# Patient Record
Sex: FEMALE | Race: WHITE
[De-identification: names, ages, dates, MRNs, and addresses within clinical notes are randomized per-mention and may not be internally consistent; named-entity substitution may affect disease eponyms.]

---

## 2017-01-02 ENCOUNTER — HOSPITAL ENCOUNTER (OUTPATIENT)
Dept: HOSPITAL 45 - C.LAB1850 | Age: 72
Discharge: HOME | End: 2017-01-02
Attending: FAMILY MEDICINE
Payer: COMMERCIAL

## 2017-01-02 DIAGNOSIS — D64.9: Primary | ICD-10-CM

## 2017-01-31 ENCOUNTER — HOSPITAL ENCOUNTER (OUTPATIENT)
Dept: HOSPITAL 45 - C.GI | Age: 72
Discharge: HOME | End: 2017-01-31
Attending: INTERNAL MEDICINE
Payer: COMMERCIAL

## 2017-01-31 VITALS — HEART RATE: 86 BPM | OXYGEN SATURATION: 99 % | SYSTOLIC BLOOD PRESSURE: 99 MMHG | DIASTOLIC BLOOD PRESSURE: 59 MMHG

## 2017-01-31 VITALS
BODY MASS INDEX: 23.67 KG/M2 | WEIGHT: 130.27 LBS | HEIGHT: 62.01 IN | HEIGHT: 62.01 IN | WEIGHT: 130.27 LBS | BODY MASS INDEX: 23.67 KG/M2

## 2017-01-31 DIAGNOSIS — Z87.891: ICD-10-CM

## 2017-01-31 DIAGNOSIS — K57.30: ICD-10-CM

## 2017-01-31 DIAGNOSIS — Z12.11: Primary | ICD-10-CM

## 2017-01-31 DIAGNOSIS — Z88.8: ICD-10-CM

## 2017-01-31 DIAGNOSIS — Z88.5: ICD-10-CM

## 2017-01-31 DIAGNOSIS — Z98.890: ICD-10-CM

## 2017-01-31 DIAGNOSIS — K64.8: ICD-10-CM

## 2017-01-31 NOTE — DISCHARGE INSTRUCTIONS
Endoscopy Patient Instructions


Date / Procedure(s) Performed


Jan 31, 2017.


Colonoscopy





Allergy Information


Coded Allergies:  


     Ezetimibe (Unverified  Allergy, Unknown, TIRED, 1/31/17)


     Simvastatin (Unverified  Allergy, Unknown, TIRED, 1/31/17)


     Tramadol (Verified  Adverse Reaction, Mild, GI UPSET, 1/31/17)


     Atorvastatin (Verified  Adverse Reaction, Unknown, gi upset, 1/31/17)





Discharge Date / Findings


Jan 31, 2017.


Diverticulosis


Internal hemorrhoids





Medication Instructions


Stopped Medication(s):  


TOLD ONLY TO TAKE COREG TODAY


OK to resume all medications today as prescribed.


 Reported Home Medications








 Medications  Dose


 Route/Sig


 Max Daily Dose Days Date Category Dose


Instructions


 


 Claritin


  (Loratadine) 10


 Mg Tab  10 Mg


 PO QAM


    1/23/17 Reported 


 


 Vitamin D


  (Cholecalciferol)


 1,000 Unit Tab  1 Tab


 PO QAM


    1/23/17 Reported 


 


 Vitamin B Complex


  (B-Complex


 Vitamins) 1 Tab


 Tab  1 Tab


 PO QAM


    1/23/17 Reported 


 


 Multivitamin


  (Multivitamins)


 Tab  1 Tab


 PO QAM


    1/23/17 Reported 


 


 Zestril


  (Lisinopril) 10


 Mg Tab  10 Mg


 PO HS


    1/23/17 Reported 


 


 Glucophage


  (Metformin Hcl)


 1,000 Mg Tab  1,000 Mg


 PO QAM


    1/23/17 Reported 


 


 Lasix


  (Furosemide) 20


 Mg Tab  20 Mg


 PO QAM


    1/23/17 Reported 


 


 Coreg


  (Carvedilol) 6.25


 Mg Tab  2 Tab


 PO BID


    1/23/17 Reported 


 


 Crestor


  (Rosuvastatin


 Calcium) 5 Mg Tab  5 Mg


 PO MOFR


    11/15/16 Reported  Mondays and Fridays


 


 Magnesium


  (Magnesium Oxide


  (Mg Supplement)


 400 Mg Cap  400 Mg


 PO QAM


    1/20/15 Reported 


 


 Nasonex


  (Mometasone


 Furoate)  Spray  1 Spray


 NA BID


    1/20/15 Reported 


 


 Probiotic


  (Lactobacillus) 1


 Cap Cap  1 Cap


 PO QAM


    1/20/15 Reported 


 


 Ubiquinol 100 Mg


 Cap  100 Mg


 PO QAM


    1/20/15 Reported 


 


 Plaquenil


  (Hydroxychloroquine


 Sulfate) 200 Mg


 Tab  200 Mg


 PO HS


    2/18/11 Reported 


 


 Omega-3 (Fish


 Oil) 1 Ea Cap  1 Gm


 PO TID


    6/15/09 Reported 











Provider Instructions





Activity Restrictions





-  No exercising or heavy lifting for 24 hours. 


-  Do not drink alcohol the day of the procedure.


-  Do not drive a car or operate machinery until the day after the procedure.


-  Do not make any important decisions or sign important papers in 24 hours 

after the procedure.





Following Day:





-  Return to full activity which may include returning to work/school.





Diet





Start your diet with liquids and light foods (jello, soup, juice, toast).  Then 

eat your usual diet if not nauseated.





Treatment For Common After Affects





For mild abdominal pain, bloating, or excessive gas:





-  Rest


-  Eat lightly


-  Lie on right side





Follow-Up Information


Follow-up with DR BUNCH as scheduled





Anesthesia Information





What You Should Know





You have had a procedure that required some medicine to reduce anxiety and 

discomfort. This treatment is called moderate sedation.  


After receiving the treatment, you may be sleepy, but you will be able to 

breathe on your own.  The effects of the treatment may last for several hours.








Follow these instructions along with Activity/Diet recommendations noted above:





*  Do NOT do anything where dizziness or clumsiness would be dangerous.





*  Rest quietly at home today, then you can be up and about tomorrow.





*  Have a responsible person stay with you the rest of today.





*  You may have had an I.V. today.  If so, you may take the dressing off later 

today.





Recommendations


 


Call your doctor if:





*  Trouble breathing 





*  Continuous vomiting for more than 24 hours





*  Temperature above 101 degrees





*  Severe abdominal pain or bloating





*  Pain not relieved by pain medicine ordered





*  There is increased drainage or redness from any incision





*  A large amount of rectal bleeding greater than 2-3 tablespoons. 


   (If you had a polyp/s removed or have hemorrhoids, a small amount of blood -


    from the rectum is to be expected.)





*  You have any unanswered questions or concerns.





IN THE EVENT OF A SERIOUS EMERGENCY, GO TO THE NEAREST EMERGENCY ROOM





       Your discharge instructions were prepared by provider Dave Rogers.


 Patient Instructions Signature Page








Shaunna Gleason 











Patient (or Guardian) Signature/Date:____________________________________ I 

have read and understand the instructions given to me by my caregivers.








Caregiver/RN/Doctor Signature/Date:____________________________________








The above-named patient and/or guardian has received patient instructions on 

this date.


























+  Original Patient Signature Page (only) stays with chart.  Please make copy 

for patient.

## 2017-01-31 NOTE — ENDO HISTORY AND PHYSICAL
History & Physical


Date of Service:


Jan 31, 2017.


Chief Complaint:


FOLLOW UP FOR GI BLEED IN NOVEMBER 2016


Referring Physician:


DR BUNCH


History of Present Illness


72 yo CF who presents for Colonoscopy secondary to GI bleeding.





Past Surgical History


Hx Cardiac Surgery:  No


Hx Internal Defibrillator:  No


Hx Pacemaker:  No


Hx Abdominal Surgery:  Yes ([facility] BSO)


Hx of Implantable Prosthesis:  No (SINUS SURGERY, DENTAL EXTR AND IMPLANTS)


Hx Post-Op Nausea and Vomiting:  Yes (PONV)


Hx Cancer Surgery:  No


Hx Thoracic Surgery:  No


Hx Orthopedic:  Yes (ORIF RIGHT ANKLE, RIGHT ROTATOR CUFF REPAIR)


Hx Urinary Tract Surgery:  No





Family History


None





Social History


Smoking Status:  Former Smoker


Hx Substance Use:  Yes


Hx Alcohol Use:  Yes (2 A WEEK )





Allergies


Coded Allergies:  


     Ezetimibe (Unverified  Allergy, Unknown, TIRED, 1/31/17)


     Simvastatin (Unverified  Allergy, Unknown, TIRED, 1/31/17)


     Tramadol (Verified  Adverse Reaction, Mild, GI UPSET, 1/31/17)


     Atorvastatin (Verified  Adverse Reaction, Unknown, gi upset, 1/31/17)





Current Medications





 Reported Home Medications








 Medications  Dose


 Route/Sig


 Max Daily Dose Days Date Category Dose


Instructions


 


 Claritin


  (Loratadine) 10


 Mg Tab  10 Mg


 PO QAM


    1/23/17 Reported 


 


 Vitamin D


  (Cholecalciferol)


 1,000 Unit Tab  1 Tab


 PO QAM


    1/23/17 Reported 


 


 Vitamin B Complex


  (B-Complex


 Vitamins) 1 Tab


 Tab  1 Tab


 PO QAM


    1/23/17 Reported 


 


 Multivitamin


  (Multivitamins)


 Tab  1 Tab


 PO QAM


    1/23/17 Reported 


 


 Zestril


  (Lisinopril) 10


 Mg Tab  10 Mg


 PO HS


    1/23/17 Reported 


 


 Glucophage


  (Metformin Hcl)


 1,000 Mg Tab  1,000 Mg


 PO QAM


    1/23/17 Reported 


 


 Lasix


  (Furosemide) 20


 Mg Tab  20 Mg


 PO QAM


    1/23/17 Reported 


 


 Coreg


  (Carvedilol) 6.25


 Mg Tab  2 Tab


 PO BID


    1/23/17 Reported 


 


 Crestor


  (Rosuvastatin


 Calcium) 5 Mg Tab  5 Mg


 PO MOFR


    11/15/16 Reported  Mondays and Fridays


 


 Magnesium


  (Magnesium Oxide


  (Mg Supplement)


 400 Mg Cap  400 Mg


 PO QAM


    1/20/15 Reported 


 


 Nasonex


  (Mometasone


 Furoate)  Spray  1 Spray


 NA BID


    1/20/15 Reported 


 


 Probiotic


  (Lactobacillus) 1


 Cap Cap  1 Cap


 PO QAM


    1/20/15 Reported 


 


 Ubiquinol 100 Mg


 Cap  100 Mg


 PO QAM


    1/20/15 Reported 


 


 Plaquenil


  (Hydroxychloroquine


 Sulfate) 200 Mg


 Tab  200 Mg


 PO HS


    2/18/11 Reported 


 


 Omega-3 (Fish


 Oil) 1 Ea Cap  1 Gm


 PO TID


    6/15/09 Reported 











Vital Signs


Weight (Kilograms):  59.09


Height (Feet):  5


Height (Inches):  2











  Date Time  Temp Pulse Resp B/P Pulse Ox O2 Delivery O2 Flow Rate FiO2


 


1/31/17 11:05 36.6 79 16 123/73 98 Room Air  











Physical Exam


General Appearance:  WD/WN, no apparent distress


Respiratory/Chest:  


   Auscultation:  breath sounds normal


Cardiovascular:  


   Heart Auscultation:  RRR


Abdomen:  


   Bowel Sounds:  normal


   Inspection & Palpation:  soft, non-distended, no tenderness, guarding & 

rebound





Assessment and Plan


Assessment:


72 yo CF who presents for Colonoscopy secondary to GI bleeding.








Plan:


Proceed with colonoscopy.

## 2017-01-31 NOTE — ANESTHESIOLOGY PROGRESS NOTE
Anesthesia Post Op Note


Date & Time


Jan 31, 2017 at 11:53





Vital Signs


Pain Intensity:  0





 Vital Signs Past 12 Hours








  Date Time  Temp Pulse Resp B/P Pulse Ox O2 Delivery O2 Flow Rate FiO2


 


1/31/17 11:05 36.6 79 16 123/73 98 Room Air  











Notes


Mental Status:  alert / awake / arousable, participated in evaluation


Pt Amnestic to Procedure:  Yes


Nausea / Vomiting:  adequately controlled


Pain:  adequately controlled


Airway Patency, RR, SpO2:  stable & adequate


BP & HR:  stable & adequate


Hydration State:  stable & adequate


Anesthetic Complications:  no major complications apparent

## 2017-03-01 ENCOUNTER — HOSPITAL ENCOUNTER (OUTPATIENT)
Dept: HOSPITAL 45 - C.RAD1850 | Age: 72
Discharge: HOME | End: 2017-03-01
Attending: FAMILY MEDICINE
Payer: COMMERCIAL

## 2017-03-01 DIAGNOSIS — R05: Primary | ICD-10-CM

## 2017-03-16 ENCOUNTER — HOSPITAL ENCOUNTER (OUTPATIENT)
Dept: HOSPITAL 45 - C.LAB1850 | Age: 72
Discharge: HOME | End: 2017-03-16
Attending: FAMILY MEDICINE
Payer: COMMERCIAL

## 2017-03-16 DIAGNOSIS — M85.80: Primary | ICD-10-CM

## 2017-03-16 DIAGNOSIS — E55.9: ICD-10-CM

## 2017-03-16 DIAGNOSIS — D64.9: ICD-10-CM

## 2017-03-16 DIAGNOSIS — I42.9: ICD-10-CM

## 2017-03-16 DIAGNOSIS — E78.5: ICD-10-CM

## 2017-03-16 DIAGNOSIS — R73.01: ICD-10-CM

## 2017-03-16 LAB
ALBUMIN/GLOB SERPL: 1 {RATIO} (ref 0.9–2)
ALP SERPL-CCNC: 65 U/L (ref 45–117)
ALT SERPL-CCNC: 31 U/L (ref 12–78)
ANION GAP SERPL CALC-SCNC: 7 MMOL/L (ref 3–11)
AST SERPL-CCNC: 15 U/L (ref 15–37)
BUN SERPL-MCNC: 14 MG/DL (ref 7–18)
BUN/CREAT SERPL: 16.6 (ref 10–20)
CALCIUM SERPL-MCNC: 9.2 MG/DL (ref 8.5–10.1)
CHLORIDE SERPL-SCNC: 108 MMOL/L (ref 98–107)
CHOLEST/HDLC SERPL: 3.5 {RATIO}
CO2 SERPL-SCNC: 28 MMOL/L (ref 21–32)
CREAT SERPL-MCNC: 0.86 MG/DL (ref 0.6–1.2)
GLOBULIN SER-MCNC: 3.6 GM/DL (ref 2.5–4)
GLUCOSE SERPL-MCNC: 101 MG/DL (ref 70–99)
GLUCOSE UR QL: 69 MG/DL
KETONES UR QL STRIP: 126 MG/DL
NITRITE UR QL STRIP: 221 MG/DL (ref 0–150)
PH UR: 239 MG/DL (ref 0–200)
POTASSIUM SERPL-SCNC: 4.2 MMOL/L (ref 3.5–5.1)
SODIUM SERPL-SCNC: 143 MMOL/L (ref 136–145)
VERY LOW DENSITY LIPOPROT CALC: 44 MG/DL

## 2017-07-18 ENCOUNTER — HOSPITAL ENCOUNTER (OUTPATIENT)
Dept: HOSPITAL 45 - C.CTS | Age: 72
Discharge: HOME | End: 2017-07-18
Attending: PHYSICIAN ASSISTANT
Payer: COMMERCIAL

## 2017-07-18 DIAGNOSIS — R19.7: ICD-10-CM

## 2017-07-18 DIAGNOSIS — K57.30: Primary | ICD-10-CM

## 2017-07-18 DIAGNOSIS — K57.92: ICD-10-CM

## 2017-07-18 NOTE — DIAGNOSTIC IMAGING REPORT
CT OF THE ABDOMEN AND PELVIS WITH CONTRAST



CLINICAL HISTORY: Diffuse abdominal pain. Evaluate for acute diverticulitis.    



COMPARISON STUDY:  CT of the abdomen and pelvis November 15, 2016.



TECHNIQUE: Following IV administration of 94 mL of Optiray-320, axial images of

the abdomen and pelvis were obtained from the lung bases to the proximal femurs.

Images were reviewed in the axial, sagittal, and coronal planes. IV contrast was

administered without complication. Oral contrast was administered.



CT DOSE: 296.16 mGy.cm



FINDINGS: There is fatty infiltration of the liver. Extensive atherosclerotic

calcification of the splenic artery is noted. The spleen, adrenal glands,

kidneys and pancreas are normal. There is no evidence for a bowel obstruction.

There is sigmoid diverticulosis without evidence for acute diverticulitis.

Caliber and wall thickness of small and large bowel are normal. The appendix is

normal. No suspicious skeletal lesions are present. There is no hydronephrosis.







IMPRESSION:  



1. No acute process within the abdomen or pelvis.



2. Sigmoid diverticulosis without evidence for acute diverticulitis.



3. Fatty liver.







Electronically signed by:  Sekou Valles M.D.

7/18/2017 3:16 PM



Dictated Date/Time:  7/18/2017 3:07 PM

## 2017-09-14 ENCOUNTER — HOSPITAL ENCOUNTER (OUTPATIENT)
Dept: HOSPITAL 45 - C.RAD1850 | Age: 72
Discharge: HOME | End: 2017-09-14
Attending: FAMILY MEDICINE
Payer: COMMERCIAL

## 2017-09-14 DIAGNOSIS — M19.072: Primary | ICD-10-CM

## 2017-09-14 NOTE — DIAGNOSTIC IMAGING REPORT
LEFT FOOT MIN 3 VIEWS ROUTINE



CLINICAL HISTORY: M79.672 Acute foot pain, localized to the second metatarsal

phalangeal joint.



COMPARISON: None.



DISCUSSION: The bones are mildly osteopenic. No fractures or dislocations are

visualized. There is a plantar calcaneal spur. There are degenerative changes

the level the first metatarsal phalangeal joint. No destructive lesions are

visualized.    



IMPRESSION: 

1. No fractures identified

2. No destructive lesions identified

3. Degenerative changes the level the first metatarsal phalangeal joint







Electronically signed by:  Maximilian Logan M.D.

9/14/2017 4:36 PM



Dictated Date/Time:  9/14/2017 4:35 PM

## 2017-10-02 ENCOUNTER — HOSPITAL ENCOUNTER (OUTPATIENT)
Dept: HOSPITAL 45 - C.MAMM | Age: 72
Discharge: HOME | End: 2017-10-02
Attending: OBSTETRICS & GYNECOLOGY
Payer: COMMERCIAL

## 2017-10-02 DIAGNOSIS — Z12.31: Primary | ICD-10-CM

## 2017-10-02 NOTE — MAMMOGRAPHY REPORT
BILATERAL DIGITAL SCREENING MAMMOGRAM WITH CAD: 10/2/2017

CLINICAL HISTORY: Routine screening.  Patient has no complaints.  





TECHNIQUE:  Current study was also evaluated with a Computer Aided Detection (CAD) system.  



COMPARISON: Comparison is made to exams dated:  11/9/2015 mammogram, 11/7/2014 mammogram, 9/23/2013 m
ammogram, 9/17/2012 mammogram, 9/14/2011 mammogram, and 9/10/2010 mammogram - [facility]
enter.   



BREAST COMPOSITION:  There are scattered areas of fibroglandular density in both breasts.  



FINDINGS:  The parenchymal pattern is similar to prior mammograms.  No developing mass, architectural
 distortion or cluster of suspicious microcalcifications is seen in either breast.  





IMPRESSION:  ACR BI-RADS CATEGORY 2: BENIGN

There is no mammographic evidence of malignancy. A 1 year screening mammogram is recommended.  The pa
tient will receive written notification of the results.  





Approximately 10% of breast cancers are not detected with mammography. A negative mammographic report
 should not delay biopsy if a clinically suggestive mass is present.



Sheryl Cheatham M.D.          

ay/:10/2/2017 13:27:00  



Imaging Technologist: Magdalene Davis RT(R)(M)(BD), [facility]

letter sent: Normal 1/2  

BI-RADS Code: ACR BI-RADS Category 2: Benign

## 2018-02-21 ENCOUNTER — HOSPITAL ENCOUNTER (OUTPATIENT)
Dept: HOSPITAL 45 - C.LAB1850 | Age: 73
Discharge: HOME | End: 2018-02-21
Attending: FAMILY MEDICINE
Payer: COMMERCIAL

## 2018-02-21 DIAGNOSIS — R10.32: Primary | ICD-10-CM

## 2018-02-21 DIAGNOSIS — K57.90: ICD-10-CM

## 2018-02-21 LAB
BASOPHILS # BLD: 0.05 K/UL (ref 0–0.2)
BASOPHILS NFR BLD: 0.7 %
BUN SERPL-MCNC: 15 MG/DL (ref 7–18)
CALCIUM SERPL-MCNC: 9.7 MG/DL (ref 8.5–10.1)
CO2 SERPL-SCNC: 31 MMOL/L (ref 21–32)
CREAT SERPL-MCNC: 0.96 MG/DL (ref 0.6–1.2)
EOS ABS #: 0.18 K/UL (ref 0–0.5)
EOSINOPHIL NFR BLD AUTO: 252 K/UL (ref 130–400)
GLUCOSE SERPL-MCNC: 88 MG/DL (ref 70–99)
HCT VFR BLD CALC: 43.7 % (ref 37–47)
HGB BLD-MCNC: 15.3 G/DL (ref 12–16)
IG#: 0.02 K/UL (ref 0–0.02)
IMM GRANULOCYTES NFR BLD AUTO: 27.4 %
LYMPHOCYTES # BLD: 1.91 K/UL (ref 1.2–3.4)
MCH RBC QN AUTO: 31.7 PG (ref 25–34)
MCHC RBC AUTO-ENTMCNC: 35 G/DL (ref 32–36)
MCV RBC AUTO: 90.5 FL (ref 80–100)
MONO ABS #: 0.68 K/UL (ref 0.11–0.59)
MONOCYTES NFR BLD: 9.7 %
NEUT ABS #: 4.14 K/UL (ref 1.4–6.5)
NEUTROPHILS # BLD AUTO: 2.6 %
NEUTROPHILS NFR BLD AUTO: 59.3 %
PMV BLD AUTO: 9.2 FL (ref 7.4–10.4)
POTASSIUM SERPL-SCNC: 4.1 MMOL/L (ref 3.5–5.1)
RED CELL DISTRIBUTION WIDTH CV: 12.9 % (ref 11.5–14.5)
RED CELL DISTRIBUTION WIDTH SD: 42.8 FL (ref 36.4–46.3)
SODIUM SERPL-SCNC: 137 MMOL/L (ref 136–145)
WBC # BLD AUTO: 6.98 K/UL (ref 4.8–10.8)

## 2018-03-05 ENCOUNTER — HOSPITAL ENCOUNTER (EMERGENCY)
Dept: HOSPITAL 45 - C.EDB | Age: 73
Discharge: HOME | End: 2018-03-05
Payer: COMMERCIAL

## 2018-03-05 VITALS
BODY MASS INDEX: 24.6 KG/M2 | WEIGHT: 135.36 LBS | WEIGHT: 135.36 LBS | BODY MASS INDEX: 24.6 KG/M2 | HEIGHT: 62.01 IN | HEIGHT: 62.01 IN

## 2018-03-05 VITALS — SYSTOLIC BLOOD PRESSURE: 130 MMHG | DIASTOLIC BLOOD PRESSURE: 72 MMHG | OXYGEN SATURATION: 98 % | HEART RATE: 88 BPM

## 2018-03-05 VITALS — TEMPERATURE: 98.24 F

## 2018-03-05 DIAGNOSIS — E11.9: ICD-10-CM

## 2018-03-05 DIAGNOSIS — E78.5: ICD-10-CM

## 2018-03-05 DIAGNOSIS — I10: ICD-10-CM

## 2018-03-05 DIAGNOSIS — K57.32: Primary | ICD-10-CM

## 2018-03-05 DIAGNOSIS — R10.9: ICD-10-CM

## 2018-03-05 DIAGNOSIS — Z88.8: ICD-10-CM

## 2018-03-05 DIAGNOSIS — Z79.899: ICD-10-CM

## 2018-03-05 LAB
ALBUMIN SERPL-MCNC: 3.7 GM/DL (ref 3.4–5)
ALP SERPL-CCNC: 70 U/L (ref 45–117)
ALT SERPL-CCNC: 36 U/L (ref 12–78)
AST SERPL-CCNC: 22 U/L (ref 15–37)
BASOPHILS # BLD: 0.04 K/UL (ref 0–0.2)
BASOPHILS NFR BLD: 0.7 %
BUN SERPL-MCNC: 12 MG/DL (ref 7–18)
CALCIUM SERPL-MCNC: 9.3 MG/DL (ref 8.5–10.1)
CO2 SERPL-SCNC: 24 MMOL/L (ref 21–32)
CREAT SERPL-MCNC: 0.82 MG/DL (ref 0.6–1.2)
EOS ABS #: 0.17 K/UL (ref 0–0.5)
EOSINOPHIL NFR BLD AUTO: 302 K/UL (ref 130–400)
GLUCOSE SERPL-MCNC: 104 MG/DL (ref 70–99)
HCT VFR BLD CALC: 43.7 % (ref 37–47)
HGB BLD-MCNC: 15.7 G/DL (ref 12–16)
IG#: 0.01 K/UL (ref 0–0.02)
IMM GRANULOCYTES NFR BLD AUTO: 35.5 %
LIPASE: 241 U/L (ref 73–393)
LYMPHOCYTES # BLD: 1.96 K/UL (ref 1.2–3.4)
MCH RBC QN AUTO: 32.3 PG (ref 25–34)
MCHC RBC AUTO-ENTMCNC: 35.9 G/DL (ref 32–36)
MCV RBC AUTO: 89.9 FL (ref 80–100)
MONO ABS #: 0.59 K/UL (ref 0.11–0.59)
MONOCYTES NFR BLD: 10.7 %
NEUT ABS #: 2.75 K/UL (ref 1.4–6.5)
NEUTROPHILS # BLD AUTO: 3.1 %
NEUTROPHILS NFR BLD AUTO: 49.8 %
PMV BLD AUTO: 9.1 FL (ref 7.4–10.4)
POTASSIUM SERPL-SCNC: 4 MMOL/L (ref 3.5–5.1)
PROT SERPL-MCNC: 7.7 GM/DL (ref 6.4–8.2)
RED CELL DISTRIBUTION WIDTH CV: 12.7 % (ref 11.5–14.5)
RED CELL DISTRIBUTION WIDTH SD: 41.6 FL (ref 36.4–46.3)
SODIUM SERPL-SCNC: 140 MMOL/L (ref 136–145)
WBC # BLD AUTO: 5.52 K/UL (ref 4.8–10.8)

## 2018-03-05 NOTE — DIAGNOSTIC IMAGING REPORT
ABD/PELVIS IV CONTRAST ONLY



CT DOSE: 524.97 mGycm



HISTORY: Pain  worsening abdominal pain, chills, eval abscess, perf



TECHNIQUE: Multiaxial CT images of the abdomen and pelvis were performed

following the use of intravenous contrast.  A dose lowering technique was

utilized adhering to the principles of ALARA.





COMPARISON STUDY: 2/23/2018



FINDINGS: Lung bases remain clear. Stable fatty infiltration of liver. Kidneys

enhance uniformly. No evidence for hydronephrosis.



Findings of diverticulitis involving the mid descending colon are considered

stable. No evidence for abscess or collection.



Findings of acute diverticulitis involving the sigmoid colon appear at least

mildly improved. The wall thickening appears somewhat diminished as does the

pericolonic infiltrative change. No evidence for drainable abscess or

collection. Mild reactive small bowel ileus.



IMPRESSION: 



1. Acute diverticulitis of the mid descending and sigmoid colonic regions.

2. Mild improvement of the findings of the sigmoid colon with stable findings of

the mid descending colon.

3. No evidence for abscess collection or obstruction.

4. Mild reactive small bowel ileus. 







The above report was generated using voice recognition software.  It may contain

grammatical, syntax or spelling errors.







Electronically signed by:  Lei Lewis M.D.

3/5/2018 4:59 PM



Dictated Date/Time:  3/5/2018 4:52 PM

## 2018-03-05 NOTE — EMERGENCY ROOM VISIT NOTE
ED Visit Note


First contact with patient:  15:24


CHIEF  COMPLAINT: Abdominal pain and chills





HISTORY OF PRESENTING ILLNESS: This is a 72-year-old female who presents to the 

emergency department with abdominal pain that started 3 days ago.  Patient 

states that she was recently diagnosed with acute diverticulitis on 2/23, she 

was treated with ciprofloxacin and Flagyl which she completed today.  She 

states that initially she was feeling much better and her abdominal pain had 

completely gone away, but her pain started to come back 3 days ago and has been 

getting progressively worse.  She has also been feeling more tired and fatigued 

the past few days.  Today she developed some chills and body aches, no known 

fevers.  She has not taken any Tylenol today.  She states that she has been 

having associated nausea and loose stools the entire time that have not improved

, states that she is having 7-8 small loose stools per day.  She denies any 

bloody or black stools and has not been vomiting.  She saw her PCP today who 

sent her to the ER for further evaluation.  She denies any other symptoms of 

headaches, vision changes, chest pain, shortness of breath, back pain, urinary 

symptoms, or rash.





REVIEW OF SYSTEMS: A complete 10 point review of systems was reviewed with the 

patient with pertinent positives and negatives as per history of present 

illness. All else were negative.





PAST MEDICAL HISTORY: Reviewed in chart.





SOCIAL HISTORY: Lives at home.  She denies tobacco use, alcohol use, 

recreational drug use.





ALLERGIES: Reviewed in chart.





PHYSICAL EXAM:


CONSTITUTIONAL: Pleasant and cooperative.  No acute distress.  Mildly dehydrated

, but otherwise well appearing and well nourished. 


HEENT: Normocephalic, atraumatic. Pupils equal, round and reactive to light, 

EOMI. TMs normal. Pharynx normal.  Tacky mucous membranes.  No cervical 

adenopathy.


NECK: Supple, full active range of motion without discomfort.


RESPIRATORY: Clear to auscultation bilaterally with no wheezing, crackles, 

rhonchi or stridor. Equal expansion bilaterally.


CARDIOVASCULAR: Regular rate and rhythm with no murmurs, rubs or gallops. 

Normal peripheral perfusion. No edema.


GASTROINTESTINAL: Soft, moderate tenderness in the entire left abdomen, most 

tender in the left lower quadrant.  No rebound tenderness or guarding.  

Nondistended.  No palpable masses or HSM. Bowel sounds present in all 

quadrants.  No CVA tenderness.


MUSCULOSKELETAL: Full range of motion of all joints without discomfort.


INTEGUMENTARY: No rash or other significant dermatologic conditions noted.


NEUROLOGIC: Alert and oriented X 4 with normal affect.  Normal strength and 

sensation upper extremities.  No focal neurologic deficits noted.  Normal 

speech.  Normal gait observed.








ED COURSE AND MEDICAL DECISION MAKING: 





CC: Patient presenting with complaint of abdominal pain





DIFFERENTIAL DIAGNOSIS:  Includes, but not limited to worsening diverticulitis 

including abscess, fistula, perforation, gastroenteritis, pancreatitis, 

cholecystitis, colitis, UTI, small bowel obstruction, ileus, dehydration, among 

others.





INTERPRETATION OF LABS: No leukocytosis, no anemia, no significant electrolyte 

abnormalities, normal renal function, normal liver enzymes and lipase.  UA 

negative.





IMAGING:  


ABD/PELVIS IV CONTRAST ONLY





CT DOSE: 524.97 mGycm





HISTORY: Pain  worsening abdominal pain, chills, eval abscess, perf





TECHNIQUE: Multiaxial CT images of the abdomen and pelvis were performed


following the use of intravenous contrast.  A dose lowering technique was


utilized adhering to the principles of ALARA.








COMPARISON STUDY: 2/23/2018





FINDINGS: Lung bases remain clear. Stable fatty infiltration of liver. Kidneys


enhance uniformly. No evidence for hydronephrosis.





Findings of diverticulitis involving the mid descending colon are considered


stable. No evidence for abscess or collection.





Findings of acute diverticulitis involving the sigmoid colon appear at least


mildly improved. The wall thickening appears somewhat diminished as does the


pericolonic infiltrative change. No evidence for drainable abscess or


collection. Mild reactive small bowel ileus.





IMPRESSION: 





1. Acute diverticulitis of the mid descending and sigmoid colonic regions.


2. Mild improvement of the findings of the sigmoid colon with stable findings of


the mid descending colon.


3. No evidence for abscess collection or obstruction.


4. Mild reactive small bowel ileus.








MEDICATION RECONCILIATION:  I attest that I have personally reviewed the patient

's current medication list.





INITIAL VITAL SIGNS REVIEW:  I reviewed the patient's initial vital signs and 

interpret them as follows: T: Afebrile;  BP: Hypertensive;  HR: Within normal 

limits;  RR: Within normal limits;  Pulse Ox: Within normal limits on room air.


Blood pressure screening: The patient was found to have an elevated blood 

pressure and was referred to their primary doctor for recheck and further 

treatment.





SUMMARY: 


Patient was evaluated at bedside, history and physical exam performed.


Patient is alert and oriented, no acute distress, sitting in a chair in the 

room.


On exam, she does have moderate tenderness to the left abdomen, most tender in 

the left lower quadrant.  No rebound tenderness or guarding.


Patient does also appear to be mildly dehydrated.


I did review the patient's records, noting her recent visit and CT findings 

consistent with acute diverticulitis.


Orders were placed at bedside for labs, UA, IV fluids for hydration, IV Tylenol 

for pain, CT abdomen/pelvis with IV contrast to evaluate for sequela of 

diverticulitis.


Patient discussed with Dr. Ugalde, who agrees with my assessment and plan.


Labs and imaging reviewed as above, all appears to be improving from previous 

studies.


Patient reassessed multiple times throughout ED stay, she has remained stable 

and her pain is improving.


Patient has been tolerating oral fluids without difficulty.  Her abdominal 

tenderness is improved with Tylenol.


I discussed the results with the patient, and discussed option of seeking 

admission versus extending her oral antibiotic treatment for her diverticulitis.


Given that the patient has a normal white count, has been afebrile, tolerating 

PO, and appears to be improving on CT, I feel that she can safely be discharged 

home.


Patient did state that she preferred to be discharged home if possible.  


I will extend her ciprofloxacin and Flagyl treatment for another 7 days.  She 

was given doses of these in the ED.


I did instruct the patient to follow closely with her primary care provider for 

ongoing assessment and management.


Patient was also given strict return precautions should her symptoms worsen, 

she verbalized understanding.


Patient was discharged home in stable condition and ambulatory.


Problem List


Medical Problems:


(1) Acute diverticulitis


Status: Resolved  





(2) Bronchitis


Status: Resolved  





(3) Diab Tuyet Wo Compl, Type Ii Or Unspec Type, Not Uncntrld


Status: Chronic  





(4) Dyspnea


Status: Resolved  





(5) Esophageal Reflux


Status: Chronic  





(6) Essential (Primary) Hypertension


Status: Chronic  





(7) Hyperlipidemia, Unspecified


Status: Chronic  





(8) Lower GI bleeding


Status: Resolved  





(9) Rectal bleeding


Status: Resolved  





Surgical Problems:


(1) H/O: hysterectomy


Status: Resolved  





(2) Lens Replacement Nec


Status: Resolved  





(3) S/P rotator cuff repair


Status: Resolved  











Current/Historical Medications


Scheduled


B-Complex Vitamins (Vitamin B Complex), 1 TAB PO QAM


Carvedilol (Coreg), 2 TAB PO BID


Carvedilol (Carvedilol), 12.5 MG PO AMPM


Ciprofloxacin Hcl (Cipro), 500 MG PO BID


Fish Oil (Omega-3), 1 GM PO TID


Furosemide (Lasix), 20 MG PO QAM


Hydroxychloroquine Sulfate (Plaquenil), 200 MG PO DAILY


Lactobacillus (Probiotic), 1 CAP PO QAM


Lisinopril (Prinivil), 5 MG PO DAILY


Magnesium Oxide (Mg Supplement (Magnesium), 400 MG PO QAM


Metronidazole (Flagyl), 500 MG PO TID


Multivitamin (Multivitamin), 1 TAB PO QAM





Allergies


Coded Allergies:  


     Tramadol (Verified  Adverse Reaction, Mild, GI UPSET, 3/5/18)


     Atorvastatin (Verified  Adverse Reaction, Unknown, gi upset, 3/5/18)


     Ezetimibe (Verified  Adverse Reaction, Unknown, TIRED, 3/5/18)


     Simvastatin (Verified  Adverse Reaction, Unknown, TIRED, 3/5/18)





Vital Signs











  Date Time  Temp Pulse Resp B/P (MAP) Pulse Ox O2 Delivery O2 Flow Rate FiO2


 


3/5/18 19:12  88 18 130/72 98   


 


3/5/18 18:39  72 20 145/76 95 Room Air  


 


3/5/18 17:35  63 20 133/79 97 Room Air  


 


3/5/18 17:08  61 20 135/69 95 Room Air  


 


3/5/18 16:26  67      


 


3/5/18 15:20 36.8 67 16 162/85 95 Room Air  











Laboratory Results


3/5/18 15:50








Red Blood Count 4.86, Mean Corpuscular Volume 89.9, Mean Corpuscular Hemoglobin 

32.3, Mean Corpuscular Hemoglobin Concent 35.9, Mean Platelet Volume 9.1, 

Neutrophils (%) (Auto) 49.8, Lymphocytes (%) (Auto) 35.5, Monocytes (%) (Auto) 

10.7, Eosinophils (%) (Auto) 3.1, Basophils (%) (Auto) 0.7, Neutrophils # (Auto

) 2.75, Lymphocytes # (Auto) 1.96, Monocytes # (Auto) 0.59, Eosinophils # (Auto

) 0.17, Basophils # (Auto) 0.04





3/5/18 15:50

















Test


  3/5/18


15:50 3/5/18


16:16


 


White Blood Count


  5.52 K/uL


(4.8-10.8) 


 


 


Red Blood Count


  4.86 M/uL


(4.2-5.4) 


 


 


Hemoglobin


  15.7 g/dL


(12.0-16.0) 


 


 


Hematocrit 43.7 % (37-47)  


 


Mean Corpuscular Volume


  89.9 fL


() 


 


 


Mean Corpuscular Hemoglobin


  32.3 pg


(25-34) 


 


 


Mean Corpuscular Hemoglobin


Concent 35.9 g/dl


(32-36) 


 


 


Platelet Count


  302 K/uL


(130-400) 


 


 


Mean Platelet Volume


  9.1 fL


(7.4-10.4) 


 


 


Neutrophils (%) (Auto) 49.8 %  


 


Lymphocytes (%) (Auto) 35.5 %  


 


Monocytes (%) (Auto) 10.7 %  


 


Eosinophils (%) (Auto) 3.1 %  


 


Basophils (%) (Auto) 0.7 %  


 


Neutrophils # (Auto)


  2.75 K/uL


(1.4-6.5) 


 


 


Lymphocytes # (Auto)


  1.96 K/uL


(1.2-3.4) 


 


 


Monocytes # (Auto)


  0.59 K/uL


(0.11-0.59) 


 


 


Eosinophils # (Auto)


  0.17 K/uL


(0-0.5) 


 


 


Basophils # (Auto)


  0.04 K/uL


(0-0.2) 


 


 


RDW Standard Deviation


  41.6 fL


(36.4-46.3) 


 


 


RDW Coefficient of Variation


  12.7 %


(11.5-14.5) 


 


 


Immature Granulocyte % (Auto) 0.2 %  


 


Immature Granulocyte # (Auto)


  0.01 K/uL


(0.00-0.02) 


 


 


Anion Gap


  9.0 mmol/L


(3-11) 


 


 


Est Creatinine Clear Calc


Drug Dose 54.3 ml/min 


  


 


 


Estimated GFR (


American) 83.4 


  


 


 


Estimated GFR (Non-


American 72.0 


  


 


 


BUN/Creatinine Ratio 14.2 (10-20)  


 


Calcium Level


  9.3 mg/dl


(8.5-10.1) 


 


 


Total Bilirubin


  0.4 mg/dl


(0.2-1) 


 


 


Direct Bilirubin


  0.1 mg/dl


(0-0.2) 


 


 


Aspartate Amino Transf


(AST/SGOT) 22 U/L (15-37) 


  


 


 


Alanine Aminotransferase


(ALT/SGPT) 36 U/L (12-78) 


  


 


 


Alkaline Phosphatase


  70 U/L


() 


 


 


Total Protein


  7.7 gm/dl


(6.4-8.2) 


 


 


Albumin


  3.7 gm/dl


(3.4-5.0) 


 


 


Lipase


  241 U/L


() 


 


 


Urine Color  YELLOW 


 


Urine Appearance  CLEAR (CLEAR) 


 


Urine pH  6.5 (4.5-7.5) 


 


Urine Specific Gravity


  


  1.014


(1.000-1.030)


 


Urine Protein  NEG (NEG) 


 


Urine Glucose (UA)  NEG (NEG) 


 


Urine Ketones  NEG (NEG) 


 


Urine Occult Blood  NEG (NEG) 


 


Urine Nitrite  NEG (NEG) 


 


Urine Bilirubin  NEG (NEG) 


 


Urine Urobilinogen  NEG (NEG) 


 


Urine Leukocyte Esterase  NEG (NEG) 











Medications Administered











 Medications


  (Trade)  Dose


 Ordered  Sig/Jeanie


 Route  Start Time


 Stop Time Status Last Admin


Dose Admin


 


 Sodium Chloride  500 ml @ 


 999 mls/hr  Q31M STAT


 IV  3/5/18 15:38


 3/5/18 16:08 DC 3/5/18 15:51


999 MLS/HR


 


 Acetaminophen  100 ml @ 


 400 mls/hr  NOW  STAT


 IV  3/5/18 15:38


 3/5/18 15:52 DC 3/5/18 15:52


400 MLS/HR


 


 Ciprofloxacin


  (Cipro Tab)  500 mg  NOW  STAT


 PO  3/5/18 18:24


 3/5/18 18:26 DC 3/5/18 18:38


500 MG


 


 Metronidazole


  (Flagyl Tab)  500 mg  NOW  STAT


 PO  3/5/18 18:24


 3/5/18 18:26 DC 3/5/18 18:38


500 MG











Departure Information


Impression





 Primary Impression:  


 Diverticulitis





Dispostion


Home / Self-Care





Condition


GOOD





Prescriptions





Metronidazole (Flagyl) 500 Mg Tab


500 MG PO TID for Pain for 7 Days, #21 TAB


   For Initial Treatment


   Prov: Lianne Ruth CRNP         3/5/18 


Ciprofloxacin Hcl (CIPRO) 500 Mg Tab


500 MG PO BID for 7 Days, #14 TAB


   Prov: Lianne Ruth CRNP         3/5/18





Referrals


Florecita Hummel MD (PCP)








Trino Crhisty M.D.





Patient Instructions


ED Diet Clear Liquid, ED Diverticulitis, [facility]





Additional Instructions





You have been treated in the Emergency Department your abdominal pain. 

Laboratory results and imaging studies have ruled out any emergent causes for 

your symptoms which would warrant admission or surgery. 





Your CT scan today shows some improvement in her diverticulitis.  You are being 

placed on additional antibiotics for continued treatment of the infection.





You were prescribed ciprofloxacin and Flagyl to be taken for 7 days.  These 

medications are antibiotics.  All antibiotics have the potential to cause 

diarrhea. Stop this medication and contact a medical provider if you were to 

develop any significant adverse side effects including: wheezing, shortness of 

breath, passing out, vomiting, or a diffuse rash. Always take antibiotics as 

directed and COMPLETE the ENTIRE course regardless of the improvement of your 

symptoms.





For pain control, you can use the following over-the-counter medicines (if >11 yo):





- Regular strength (325mg/tab) Tylenol (acetaminophen) 2 tabs every 4-6 hours 

as needed. Do not exceed 10 tablets in a 24 hour period. Avoid taking more than 

3000 mg of Tylenol per day. This includes any other sources of acetaminophen 

you may take on a regular basis.





- Regular strength (200 mg/tab) Advil (ibuprofen) 3 tabs every 6-8 6 hours as 

needed. Do not exceed a dose of 2400 mg per day.





Stick with clear fluids and bland foods until her symptoms have improved.





Drink plenty of fluids to stay well hydrated. 





Please follow-up with your primary care provider in the next 1-2 days to ensure 

that her symptoms are not getting worse.  Call for an appointment.





Return to the emergency department for severe worsening abdominal or back pain, 

worsening nausea, persistent vomiting and unable to tolerate fluids, vomiting 

blood, blood in your stool , fevers > 101, severe dizziness or passing out, or 

any other concerns.

## 2018-03-30 ENCOUNTER — HOSPITAL ENCOUNTER (OUTPATIENT)
Dept: HOSPITAL 45 - C.LAB1850 | Age: 73
Discharge: HOME | End: 2018-03-30
Attending: INTERNAL MEDICINE
Payer: COMMERCIAL

## 2018-03-30 DIAGNOSIS — I50.22: Primary | ICD-10-CM

## 2018-03-30 LAB
BUN SERPL-MCNC: 19 MG/DL (ref 7–18)
CALCIUM SERPL-MCNC: 9.7 MG/DL (ref 8.5–10.1)
CO2 SERPL-SCNC: 29 MMOL/L (ref 21–32)
CREAT SERPL-MCNC: 0.92 MG/DL (ref 0.6–1.2)
GLUCOSE SERPL-MCNC: 91 MG/DL (ref 70–99)
POTASSIUM SERPL-SCNC: 4.4 MMOL/L (ref 3.5–5.1)
SODIUM SERPL-SCNC: 137 MMOL/L (ref 136–145)

## 2018-08-16 ENCOUNTER — HOSPITAL ENCOUNTER (OUTPATIENT)
Dept: HOSPITAL 45 - C.LAB1850 | Age: 73
Discharge: HOME | End: 2018-08-16
Attending: PHYSICIAN ASSISTANT
Payer: COMMERCIAL

## 2018-08-16 DIAGNOSIS — I50.22: Primary | ICD-10-CM

## 2018-08-16 LAB
BUN SERPL-MCNC: 15 MG/DL (ref 7–18)
CALCIUM SERPL-MCNC: 9 MG/DL (ref 8.5–10.1)
CO2 SERPL-SCNC: 26 MMOL/L (ref 21–32)
CREAT SERPL-MCNC: 1.13 MG/DL (ref 0.6–1.2)
GLUCOSE SERPL-MCNC: 103 MG/DL (ref 70–99)
POTASSIUM SERPL-SCNC: 4 MMOL/L (ref 3.5–5.1)
SODIUM SERPL-SCNC: 138 MMOL/L (ref 136–145)